# Patient Record
(demographics unavailable — no encounter records)

---

## 2017-05-25 NOTE — [facility]
LOWER EXT JOINT WO RT, PELVIS WO CONTRAST dated 5/25/2017 4:15 PM

 

No comparison available.

 

CLINICAL INDICATION: Right hip pain after exercise is 2 weeks ago.

 

TECHNIQUE:

 

Routine multiplanar multisequence MR imaging of the pelvis and right hip 

performed. No contrast administered.

 

FINDINGS:

 

Images of the hips show homogeneous signal throughout the visualized bone 

marrow. No marrow edema. Alignment is anatomic. Mild hypertrophic change 

of the pubic symphysis and bilateral SI joint.

 

Uterus is surgically absent. No free fluid or lymphadenopathy. There is a 

1.8 cm nodular focus of T2 hyperintense signal within the proximal left 

thigh posterior to the femur the could represent a venous varix or 

ganglion.

 

Images of the right hip show homogeneous signal throughout the visualized 

bone marrow. There is mild hypertrophic change of the right hip joint with

blunted morphology of the anterior superior labrum and mild signal 

irregularity of the posterior labrum. No perilabral cyst. No significant 

joint effusion or loose body. No focal chondral defect.

 

Mild increased signal and thickening of the gluteus minimus and gluteus 

medius tendons at their trochanteric attachment. No full thickness tear. 

No significant trochanteric bursal fluid. The hamstring tendon complex is 

intact. Iliopsoas is intact. No significant intramuscular edema or fatty 

atrophy.

 

 

IMPRESSION:

1. No acute bony or soft tissue abnormality.

2. Mild gluteus minimus and gluteus medius tendinosis on the right.

3. Status post hysterectomy.

4. Mild degenerative arthrosis right hip with possible mild degenerative 

fraying of the right hip labrum.

 

Electronically signed by: Darrell Dinero MD (5/25/2017 4:57 PM)

## 2018-04-06 NOTE — RAD
CT abdomen and pelvis with contrast  

 

History: FALL OFF COUNTER ON MONDAY, RIGHT UPPER QUADRANT PAIN, HEMATURIA

 

Technique:  After the administration of oral and intravenous contrast, CT 

imaging was performed of the abdomen and pelvis. Multiplanar images are 

reviewed.  Exposure: One or more of the following individualized dose 

reduction techniques were utilized for this examination:  1. Automated 

exposure control  2. Adjustment of the mA and/or kV according to patient 

size  3. Use of iterative reconstruction technique.

 

Comparison:  None

 

Findings:

There is mild atelectasis at the lung bases bilaterally.  There is no 

significant abnormality of the liver, spleen, pancreas, adrenal glands. 

Both kidneys enhance without hydronephrosis. 0.7 cm hypodense lesion mid 

left kidney is otherwise difficult to accurately characterize.  

Gallbladder is present without obvious intraluminal abnormality by CT.

 

There is no significant inflammatory change adjacent to the bowel. There 

is no evidence of bowel obstruction, free fluid, or free air. Appendix is 

not confidently identified if still present.

 

Urinary bladder has a normal configuration.   There is no significant 

lymphadenopathy. There is 0.9 cm focus of nonspecific sclerosis of the 

left sacrum.

 

Impression:

1.  No acute abnormality is identified.

2. 0.7 hypodense lesion mid left kidney is difficult to otherwise 

accurately characterize.  There is focus of nonspecific sclerosis of the 

left sacrum.

 

 

Electronically signed by: Mariano Bustillos MD (4/6/2018 11:48 AM) 

[facility]

## 2018-09-25 NOTE — RAD
Soft tissue ultrasound of the neck

 

HISTORY: Bilateral neck pain, greater on the right.

 

TECHNIQUE: Scanning was performed of the soft tissue neck.

 

FINDINGS:

Numerous small soft tissue nodules are seen bilaterally, likely lymph 

nodes. Largest on the right measures 5 mm long axis. Largest on the left 

measures 8 mm long axis. No pathologically enlarged lymph nodes are 

identified. No abnormal fluid collection is seen.

 

IMPRESSION: Bilateral small nonpathologic lymph nodes are identified, 

without other abnormality.

 

Electronically signed by: Darrell Weaver MD (9/25/2018 5:00 PM) [facility]

## 2018-10-31 NOTE — ED.ADGEN
Past History


Past Medical History:  Anxiety, GERD, Other





Adult General


Chief Complaint


Chief Complaint


".. My tongue has been swollen ever since I started the Omeprazole.. and my 

throat is more raw..."





HPI


HPI





Patient is a 69 year old female who presents with above hx  and complaints of 

left time feeling swollen and raw throat after starting omeprazole.  Patient 

recently diagnosed with severe gastroesophageal reflux and esophagitis.  

Diagnosis by EGD by Dr. Delgado.  Patient started on 40 mg of omeprazole.  His 

only new drug or food patient has had since start of symptoms.   Strep screen 

here showed no findings consistent with acute pharyngitis from strep.  There is 

no stridor of throat and tongue does not on appearance appear to be swollen.


Patient is not on any blood pressure medications. Does have a history of 

thyroid disorder. She does have extensive left of allergies to medications.  He 

states she's almost allergic to all medications.





Patient normally follows with Dr. Busch.





Review of Systems


Review of Systems





Constitutional: Denies fever or chills []


Eyes: Denies change in visual acuity, redness, or eye pain []


HENT: Denies nasal congestion or sore throat []


Respiratory: Denies cough or shortness of breath []


Cardiovascular: No additional information not addressed in HPI []


GI: Denies abdominal pain, nausea, vomiting, bloody stools or diarrhea []


: Denies dysuria or hematuria []


Musculoskeletal: Denies back pain or joint pain []


Integument: Denies rash or skin lesions []


Neurologic: Denies headache, focal weakness or sensory changes []


Endocrine: Denies polyuria or polydipsia []





All other systems were reviewed and found to be within normal limits, except as 

documented in this note.





Family History


Family History


Noncontributory





Current Medications


Current Medications





Current Medications








 Medications


  (Trade)  Dose


 Ordered  Sig/Lukasz  Start Time


 Stop Time Status Last Admin


Dose Admin


 


 Famotidine


  (Pepcid)  20 mg  1X  ONCE  10/31/18 22:45


 10/31/18 22:46 DC 10/31/18 22:59


20 MG


 


 Sucralfate


  (Carafate)  1 gm  STK-MED ONCE  10/31/18 22:41


 10/31/18 22:42 DC  














Allergies


Allergies





Allergies








Coded Allergies Type Severity Reaction Last Updated Verified


 


  azithromycin Allergy Intermediate  4/6/18 Yes


 


  codeine Allergy Intermediate Nausea 4/6/18 Yes


 


  latex Allergy Intermediate Rash 4/6/18 Yes


 


  fentanyl Allergy Mild  10/31/18 Yes


 


  ibuprofen Allergy Mild  10/31/18 Yes


 


  ketorolac Allergy Mild  10/31/18 Yes


 


  meperidine Allergy Mild  10/31/18 Yes


 


  morphine Allergy Mild  10/31/18 Yes


 


  oxycodone Allergy Mild  10/31/18 Yes


 


  tramadol Allergy Mild  10/31/18 Yes


 


  Penicillins Allergy Unknown  4/6/18 No


 


  Sulfa (Sulfonamide Antibiotics) Allergy Unknown  4/6/18 No











Physical Exam


Physical Exam





Constitutional: Mild distress, non-toxic appearance. []


HENT: Normocephalic, atraumatic, bilateral external ears normal, oropharynx 

mildly injected but moist, no oral exudates, nose normal. []


Eyes: PERRLA, EOMI, conjunctiva normal, no discharge. [] 


Neck: Normal range of motion, no tenderness, supple, no stridor. [] 


Cardiovascular:Heart rate regular rhythm, no murmur []


Lungs & Thorax:  Bilateral breath sounds clear to auscultation []


Abdomen: Bowel sounds normal, soft, no tenderness, no masses, no pulsatile 

masses. [] 


Skin: Warm, dry, no erythema, no rash. [] 


Back: No tenderness, no CVA tenderness. [] 


Extremities: No tenderness, no cyanosis, no clubbing, ROM intact, no edema. [] 


Neurologic: Alert and oriented X 3, normal motor function, normal sensory 

function, no focal deficits noted. []


Psychologic: Affect anxious, judgement normal, mood normal. []





Current Patient Data


Vital Signs





 Vital Signs








  Date Time  Temp Pulse Resp B/P (MAP) Pulse Ox O2 Delivery O2 Flow Rate FiO2


 


10/31/18 21:38 98.1 51 18  97 Room Air  








Lab Results





 Laboratory Tests








Test


 10/31/18


21:45


 


Influenza Type A (Rapid)


 Negative


(NEGATIVE)


 


Influenza Type B (Rapid)


 Negative


(NEGATIVE)


 


Group A Streptococcus Rapid


 Negative


(NEGATIVE)











EKG


EKG


[]





Radiology/Procedures


Radiology/Procedures


[]





Course & Med Decision Making


Course & Med Decision Making


Pertinent Labs and Imaging studies reviewed. (See chart for details).   





Patient to stop omeprazole-if it is felt this drug has caused her symptoms.  

Consider retrial later.  Pt.  For now  will take Carafate 1 g 4 times a day and 

Zantac 300 mg twice a day for her severe esophagitis.  Patient to take Benadryl 

25-50 mg 4 times a day for allergy symptoms.  Will not start steroids at this 

time because of her severe esophagitis and patient currently declining the use.

  Patient follows with her primary care. Patient follow-up with GI. Patient 

return if any concerns.  Patient to continue her GERD practices- no eating 

before bed, sleep with head elevated.  Avoid caffeine.





[]





Final Impression


Final Impression


1.  Allergic Reaction- Possible Omeprazole


2.  Hx. of Severe Esophagitis/ GERD[]





Dragon Disclaimer


Dragon Disclaimer


This electronic medical record was generated, in whole or in part, using a 

voice recognition dictation system.











JOYCE RADFORD MD Oct 31, 2018 21:29

## 2018-11-01 NOTE — RAD
Chest, 2 views, 11/1/2018:

 

HISTORY: Shortness of breath

 

The heart size is normal. There is linear scarring or atelectasis in the 

lung bases. Similar findings were present on the 11/13/2015 exam. No 

pulmonary consolidation is seen. There is no evidence of pleural fluid.

 

IMPRESSION: Mild bibasilar linear scarring or recurrent atelectasis.

 

Electronically signed by: Rick Moritz, MD (11/1/2018 10:46 AM) [facility]

## 2018-11-01 NOTE — PHYS DOC
Past History


Past Medical History:  Anxiety, GERD, Other


Past Surgical History:  Cervical Fusion, Hysterectomy, Tonsillectomy


Alcohol Use:  None


Drug Use:  None





Adult General


Chief Complaint


Chief Complaint:  DIZZY/LIGHT HEADED





[facility]


HPI


69-year-old female returns to the emergency department with dizziness and "

funny feeling" in her chest this morning. The patient woke up at 6 AM and was 

feeling fine. When she woke up at 8 AM she felt like her throat bite. Swelling 

again so she took a repeat dose of Benadryl. She also had a feeling in her 

chest that she describes as a flutter that lasted for 15 or 20 minutes. It did 

cause her to have some shortness of breath, but no diaphoresis. Patient does 

not have a cardiac history. She was seen in emergency room last night for 

swollen tongue and possible allergic reaction to omeprazole. She is recently 

placed on omeprazole because she had an EGD that showed severe esophagitis as 

well as esophageal stricture. She was dilated at that time. This procedure was 

about 10 days ago. Today she complains of a dizziness which she describes as a 

floating feeling. She denies rotation of the room. She admits to feeling like 

she is dehydrated with a dry mouth. She states that the swelling of her mouth 

and throat feels better since the Benadryl. She has multiple drug allergies and 

intolerances. She stopped taking 0.5 mg of Xanax at bedtime 3 days ago.





Review of Systems


Review of Systems





Constitutional: Denies fever or chills []


Eyes: Denies change in visual acuity, redness, or eye pain []


HENT: Denies nasal congestion or sore throat []


Respiratory: Denies cough or shortness of breath []


Cardiovascular: No additional information not addressed in HPI []


GI: Denies abdominal pain, nausea, vomiting, bloody stools or diarrhea []


: Denies dysuria or hematuria []


Musculoskeletal: Denies back pain or joint pain []


Integument: Denies rash or skin lesions []


Neurologic: Dizziness, Denies headache, focal weakness or sensory changes []


Endocrine: Denies polyuria or polydipsia []





All other systems were reviewed and found to be within normal limits, except as 

documented in this note.





Allergies


Allergies





Allergies








Coded Allergies Type Severity Reaction Last Updated Verified


 


  azithromycin Allergy Intermediate  4/6/18 Yes


 


  codeine Allergy Intermediate Nausea 4/6/18 Yes


 


  latex Allergy Intermediate Rash 4/6/18 Yes


 


  fentanyl Allergy Mild  10/31/18 Yes


 


  ibuprofen Allergy Mild  10/31/18 Yes


 


  ketorolac Allergy Mild  10/31/18 Yes


 


  meperidine Allergy Mild  10/31/18 Yes


 


  morphine Allergy Mild  10/31/18 Yes


 


  oxycodone Allergy Mild  10/31/18 Yes


 


  tramadol Allergy Mild  10/31/18 Yes


 


  Penicillins Allergy Unknown  4/6/18 No


 


  Sulfa (Sulfonamide Antibiotics) Allergy Unknown  4/6/18 No











Physical Exam


Physical Exam





Constitutional: Well developed, well nourished, no acute distress, non-toxic 

appearance. []


HENT: Normocephalic, atraumatic, bilateral external ears normal, oropharynx 

moist, no oral exudates, nose normal. []


Eyes: PERRLA, EOMI, conjunctiva normal, no discharge. [] 


Neck: Normal range of motion, no tenderness, supple, no stridor. [] 


Cardiovascular:Heart rate regular rhythm, bradycardia, rate 50, no murmur []


Lungs & Thorax:  Bilateral breath sounds clear to auscultation []


Abdomen: Bowel sounds normal, soft, no tenderness, no masses, no pulsatile 

masses. [] 


Skin: Warm, dry, no erythema, no rash. [] 


Back: No tenderness, no CVA tenderness. [] 


Extremities: No tenderness, no cyanosis, no clubbing, ROM intact, no edema. [] 


Neurologic: Alert and oriented X 3, normal motor function, normal sensory 

function, no focal deficits noted. []


Psychologic: Affect normal, judgement normal, mood anxious, concerned. []





EKG


EKG


Sinus bradycardia, rate 45, axis, no ST elevations or depressions.[]





Radiology/Procedures


Radiology/Procedures


[]


Impressions:


Chest, 2 views, 11/1/2018:


 


HISTORY: Shortness of breath


 


The heart size is normal. There is linear scarring or atelectasis in the 


lung bases. Similar findings were present on the 11/13/2015 exam. No 


pulmonary consolidation is seen. There is no evidence of pleural fluid.


 


IMPRESSION: Mild bibasilar linear scarring or recurrent atelectasis.


 


Electronically signed by: Rick Moritz, MD (11/1/2018 10:46 AM) [facility]














DICTATED AND SIGNED BY:     MORITZ,RICK S MD


DATE:     11/01/18 1043





CC: CORONA BARBOSA DO; ALICJA MEDINA MD ~





Course & Med Decision Making


Course & Med Decision Making


Pertinent Labs and Imaging studies reviewed. (See chart for details)


The patient's labs are unremarkable. Her troponin is negative. Her EKG is 

unremarkable except for bradycardia.  Her chest x-ray was remarkable for some 

basilar atelectasis or scarring. This is similar to previous x-ray. I gave the 

patient 1 L normal saline. She is feeling better at this time. She suspects 

maybe she was dehydrated. She is reassured by her negative results. She feels 

as though she is ready to go home. She is stable for discharge at this time.


[]





Dragon Disclaimer


Dragon Disclaimer


This electronic medical record was generated, in whole or in part, using a 

voice recognition dictation system.





Departure


Departure:


Referrals:  


ALICJA MEDINA MD (PCP)











CORONA BARBOSA DO Nov 1, 2018 10:23

## 2018-11-01 NOTE — EKG
[facility] [address], [place] [address]

Test Date:    2018               Test Time:    10:51:28

Pat Name:     LUIGI FLAHERTY              Department:   

Patient ID:   [id number]           Room:          

Gender:       F                        Technician:   

:          1948               Requested By: CORONA BARBOSA

Order Number: [id number]            Reading MD:     

                                 Measurements

Intervals                              Axis          

Rate:         45                       P:            31

OR:           178                      QRS:          -23

QRSD:         96                       T:            114

QT:           436                                    

QTc:          379                                    

                           Interpretive Statements

SINUS BRADYCARDIA

LEFTWARD AXIS

QRS(T) CONTOUR ABNORMALITY

CONSIDER ANTEROLATERAL MYOCARDIAL DAMAGE

T ABNORMALITY IN ANTERIOR LEADS

ABNORMAL ECG

RI6.01          Unconfirmed report

No previous ECG available for comparison